# Patient Record
Sex: MALE | Race: WHITE | NOT HISPANIC OR LATINO | URBAN - METROPOLITAN AREA
[De-identification: names, ages, dates, MRNs, and addresses within clinical notes are randomized per-mention and may not be internally consistent; named-entity substitution may affect disease eponyms.]

---

## 2019-10-23 ENCOUNTER — HOSPITAL ENCOUNTER (OUTPATIENT)
Dept: OTHER | Facility: HOSPITAL | Age: 29
Discharge: HOME | End: 2019-10-23
Attending: NURSE PRACTITIONER

## 2019-10-23 ENCOUNTER — OFFICE VISIT CONVERTED (OUTPATIENT)
Dept: FAMILY MEDICINE CLINIC | Age: 29
End: 2019-10-23
Attending: NURSE PRACTITIONER

## 2019-10-23 LAB
ALBUMIN SERPL-MCNC: 4.8 G/DL (ref 3.5–5)
ALBUMIN/GLOB SERPL: 1.8 {RATIO} (ref 1.4–2.6)
ALP SERPL-CCNC: 68 U/L (ref 53–128)
ALT SERPL-CCNC: 17 U/L (ref 10–40)
ANION GAP SERPL CALC-SCNC: 20 MMOL/L (ref 8–19)
APPEARANCE UR: CLEAR
AST SERPL-CCNC: 18 U/L (ref 15–50)
BACTERIA UR QL AUTO: ABNORMAL
BILIRUB SERPL-MCNC: 0.45 MG/DL (ref 0.2–1.3)
BILIRUB UR QL: NEGATIVE
BUN SERPL-MCNC: 14 MG/DL (ref 5–25)
BUN/CREAT SERPL: 18 {RATIO} (ref 6–20)
CALCIUM SERPL-MCNC: 9.6 MG/DL (ref 8.7–10.4)
CASTS URNS QL MICRO: ABNORMAL /[LPF]
CHLORIDE SERPL-SCNC: 101 MMOL/L (ref 99–111)
COLOR UR: YELLOW
CONV CO2: 21 MMOL/L (ref 22–32)
CONV LEUKOCYTE ESTERASE: NEGATIVE
CONV TOTAL PROTEIN: 7.5 G/DL (ref 6.3–8.2)
CONV UROBILINOGEN IN URINE BY AUTOMATED TEST STRIP: 0.2 {EHRLICHU}/DL (ref 0.1–1)
CREAT UR-MCNC: 0.78 MG/DL (ref 0.7–1.2)
EPI CELLS #/AREA URNS HPF: ABNORMAL /[HPF]
ERYTHROCYTE [DISTWIDTH] IN BLOOD BY AUTOMATED COUNT: 11.7 % (ref 11.5–14.5)
GFR SERPLBLD BASED ON 1.73 SQ M-ARVRAT: >60 ML/MIN/{1.73_M2}
GLOBULIN UR ELPH-MCNC: 2.7 G/DL (ref 2–3.5)
GLUCOSE 24H UR-MCNC: NEGATIVE MG/DL
GLUCOSE SERPL-MCNC: 87 MG/DL (ref 70–99)
HBA1C MFR BLD: 15.4 G/DL (ref 14–18)
HCT VFR BLD AUTO: 43 % (ref 42–52)
HGB UR QL STRIP: NEGATIVE
KETONES UR QL STRIP: NEGATIVE MG/DL
MCH RBC QN AUTO: 31.4 PG (ref 27–31)
MCHC RBC AUTO-ENTMCNC: 35.8 G/DL (ref 33–37)
MCV RBC AUTO: 87.8 FL (ref 80–96)
MUCOUS THREADS URNS QL MICRO: ABNORMAL
NITRITE UR-MCNC: NEGATIVE MG/ML
OSMOLALITY SERPL CALC.SUM OF ELEC: 286 MOSM/KG (ref 273–304)
PH UR STRIP.AUTO: 7 [PH] (ref 5–8)
PLATELET # BLD AUTO: 279 10*3/UL (ref 130–400)
PMV BLD AUTO: 8.9 FL (ref 7.4–10.4)
POTASSIUM SERPL-SCNC: 4 MMOL/L (ref 3.5–5.3)
PROT UR-MCNC: NEGATIVE MG/DL
RBC # BLD AUTO: 4.9 10*6/UL (ref 4.7–6.1)
RBC # BLD AUTO: ABNORMAL /[HPF]
SODIUM SERPL-SCNC: 138 MMOL/L (ref 135–147)
SP GR UR STRIP: 1.02 (ref 1–1.03)
SPECIMEN SOURCE: ABNORMAL
UNIDENT CRYS URNS QL MICRO: ABNORMAL /[HPF]
WBC # BLD AUTO: 6.96 10*3/UL (ref 4.8–10.8)
WBC #/AREA URNS HPF: ABNORMAL /[HPF]

## 2020-11-23 ENCOUNTER — OFFICE VISIT CONVERTED (OUTPATIENT)
Dept: FAMILY MEDICINE CLINIC | Age: 30
End: 2020-11-23
Attending: NURSE PRACTITIONER

## 2020-11-23 ENCOUNTER — HOSPITAL ENCOUNTER (OUTPATIENT)
Dept: OTHER | Facility: HOSPITAL | Age: 30
Discharge: HOME | End: 2020-11-23
Attending: NURSE PRACTITIONER

## 2020-11-23 LAB
ALBUMIN SERPL-MCNC: 4.4 G/DL (ref 3.5–5)
ALBUMIN/GLOB SERPL: 2 {RATIO} (ref 1.4–2.6)
ALP SERPL-CCNC: 66 U/L (ref 53–128)
ALT SERPL-CCNC: 13 U/L (ref 10–40)
ANION GAP SERPL CALC-SCNC: 17 MMOL/L (ref 8–19)
APPEARANCE UR: CLEAR
AST SERPL-CCNC: 13 U/L (ref 15–50)
BACTERIA UR QL AUTO: ABNORMAL
BASOPHILS # BLD MANUAL: 0.05 10*3/UL (ref 0–0.2)
BASOPHILS NFR BLD MANUAL: 0.8 % (ref 0–3)
BILIRUB SERPL-MCNC: 0.45 MG/DL (ref 0.2–1.3)
BILIRUB UR QL: NEGATIVE
BUN SERPL-MCNC: 19 MG/DL (ref 5–25)
BUN/CREAT SERPL: 23 {RATIO} (ref 6–20)
CALCIUM SERPL-MCNC: 8.8 MG/DL (ref 8.7–10.4)
CASTS URNS QL MICRO: ABNORMAL /[LPF]
CHLORIDE SERPL-SCNC: 103 MMOL/L (ref 99–111)
CHOLEST SERPL-MCNC: 139 MG/DL (ref 107–200)
CHOLEST/HDLC SERPL: 2.9 {RATIO} (ref 3–6)
COLOR UR: YELLOW
CONV CO2: 23 MMOL/L (ref 22–32)
CONV LEUKOCYTE ESTERASE: NEGATIVE
CONV TOTAL PROTEIN: 6.6 G/DL (ref 6.3–8.2)
CONV UROBILINOGEN IN URINE BY AUTOMATED TEST STRIP: 0.2 {EHRLICHU}/DL (ref 0.1–1)
CREAT UR-MCNC: 0.84 MG/DL (ref 0.7–1.2)
DEPRECATED RDW RBC AUTO: 36.9 FL
EOSINOPHIL # BLD MANUAL: 0.07 10*3/UL (ref 0–0.7)
EOSINOPHIL NFR BLD MANUAL: 1.2 % (ref 0–7)
EPI CELLS #/AREA URNS HPF: ABNORMAL /[HPF]
ERYTHROCYTE [DISTWIDTH] IN BLOOD BY AUTOMATED COUNT: 11.4 % (ref 11.5–14.5)
GFR SERPLBLD BASED ON 1.73 SQ M-ARVRAT: >60 ML/MIN/{1.73_M2}
GLOBULIN UR ELPH-MCNC: 2.2 G/DL (ref 2–3.5)
GLUCOSE 24H UR-MCNC: NEGATIVE MG/DL
GLUCOSE SERPL-MCNC: 100 MG/DL (ref 70–99)
GRANS (ABSOLUTE): 3.61 10*3/UL (ref 2–8)
GRANS: 59.6 % (ref 30–85)
HBA1C MFR BLD: 14.2 G/DL (ref 14–18)
HCT VFR BLD AUTO: 39.2 % (ref 42–52)
HDLC SERPL-MCNC: 48 MG/DL (ref 40–60)
HGB UR QL STRIP: NEGATIVE
IMM GRANULOCYTES # BLD: 0.02 10*3/UL (ref 0–0.54)
IMM GRANULOCYTES NFR BLD: 0.3 % (ref 0–0.43)
KETONES UR QL STRIP: NEGATIVE MG/DL
LDLC SERPL CALC-MCNC: 58 MG/DL (ref 70–100)
LYMPHOCYTES # BLD MANUAL: 2 10*3/UL (ref 1–5)
LYMPHOCYTES NFR BLD MANUAL: 5.1 % (ref 3–10)
MCH RBC QN AUTO: 32 PG (ref 27–31)
MCHC RBC AUTO-ENTMCNC: 36.2 G/DL (ref 33–37)
MCV RBC AUTO: 88.3 FL (ref 80–96)
MONOCYTES # BLD AUTO: 0.31 10*3/UL (ref 0.2–1.2)
MUCOUS THREADS URNS QL MICRO: ABNORMAL
NITRITE UR-MCNC: NEGATIVE MG/ML
OSMOLALITY SERPL CALC.SUM OF ELEC: 290 MOSM/KG (ref 273–304)
PH UR STRIP.AUTO: 5.5 [PH] (ref 5–8)
PLATELET # BLD AUTO: 273 10*3/UL (ref 130–400)
PMV BLD AUTO: 9.1 FL (ref 7.4–10.4)
POTASSIUM SERPL-SCNC: 3.8 MMOL/L (ref 3.5–5.3)
PROT UR-MCNC: NEGATIVE MG/DL
RBC # BLD AUTO: 4.44 10*6/UL (ref 4.7–6.1)
RBC # BLD AUTO: ABNORMAL /[HPF]
SODIUM SERPL-SCNC: 139 MMOL/L (ref 135–147)
SP GR UR STRIP: >=1.03 (ref 1–1.03)
SPECIMEN SOURCE: ABNORMAL
TRIGL SERPL-MCNC: 164 MG/DL (ref 40–150)
TSH SERPL-ACNC: 1.81 M[IU]/L (ref 0.27–4.2)
UNIDENT CRYS URNS QL MICRO: ABNORMAL /[HPF]
VARIANT LYMPHS NFR BLD MANUAL: 33 % (ref 20–45)
VLDLC SERPL-MCNC: 33 MG/DL (ref 5–37)
WBC # BLD AUTO: 6.06 10*3/UL (ref 4.8–10.8)
WBC #/AREA URNS HPF: ABNORMAL /[HPF]

## 2021-05-18 NOTE — PROGRESS NOTES
Chencho Vines Kailash  1990     Office/Outpatient Visit    Visit Date: Mon, Nov 23, 2020 01:47 pm    Provider: Maria Elena Montelongo N.P. (Assistant: Gerri Gibson MA)    Location: [facility]        Electronically signed by Maria Elena Montelongo N.P. on  11/23/2020 02:15:32 PM                             Subjective:        CC: Kailash is a 30 year old White male.  Preventative exam         HPI:           Patient to be evaluated for encounter for general adult medical examination without abnormal findings.  His last physical exam was last year.  He performs testicular self-exams occasionally.  Depression screen is performed and is negative.  He is current with his influenza immunization.      Smoking Status:  Nonsmoker           PHQ-9 Depression Screening: Completed form scanned and in chart; Total Score 0       wants flu shot     ROS:     CONSTITUTIONAL:  Negative for chills, fatigue, fever, and weight change.      CARDIOVASCULAR:  Negative for chest pain, palpitations, tachycardia, orthopnea, and edema.      RESPIRATORY:  Negative for cough, dyspnea, and hemoptysis.      GASTROINTESTINAL:  Positive for acid reflux symptoms ( acid taste in mouth; burning in throat and upper chest; indigestion and belching; taking Protonix daily but still having symptoms, sanjuanita after [place] ).      GENITOURINARY:  Negative for dysuria, genital lesions, hematuria, impotence, polyuria, and changes in urine stream.      ENDOCRINE:  Negative for hair loss, heat/cold intolerance, polydipsia, and polyphagia.      ALLERGIC/IMMUNOLOGIC:  Negative for allergies, frequent illnesses, HIV exposure, and urticaria.      PSYCHIATRIC:  Negative for anxiety, depression, and sleep disturbances.          Past Medical History / Family History / Social History:         Last Reviewed on 11/23/2020 02:06 PM by Maria Elena Montelongo    Past Medical History:             PAST MEDICAL HISTORY     UNREMARKABLE         Surgical History:          Appendectomy: age 17;         Family History:     Father: Healthy     Mother:    Positive for Type 2 Diabetes;     Brother(s): 1 brother alive and healhty brother(s) total         Social History:     Occupation: Super for [employer]     Marital Status:      Children: 2 children         Tobacco/Alcohol/Supplements:     Last Reviewed on 11/23/2020 02:06 PM by Maria Elena Montelongo    Tobacco: He has never smoked.          Substance Abuse History:     Last Reviewed on 11/23/2020 02:06 PM by Maria Elena Montelongo        Mental Health History:     Last Reviewed on 11/23/2020 02:06 PM by Maria Elena Montelongo        Communicable Diseases (eg STDs):     Last Reviewed on 11/23/2020 02:06 PM by Maria Elena Montelongo        Current Problems:     Last Reviewed on 11/23/2020 02:06 PM by Maria Elena Montelongo    Gastro-esophageal reflux disease with esophagitis, without bleeding        Immunizations:     Fluzone Quadrivalent (3+ years) 10/23/2019        Allergies:     Last Reviewed on 11/23/2020 02:06 PM by Maria Elena Montelongo    Ceclor:          Current Medications:     Last Reviewed on 11/23/2020 02:06 PM by Maria Elena Montelongo    pantoprazole 20 mg oral tablet, delayed release (enteric coated) [TAKE 1 TABLET BY MOUTH EVERY DAY]    EpiPen 0.3 mg/0.3 mL injection Auto-Injector [inject 0.3 milliliter (0.3 mg) by intramuscular route once]        Objective:        Vitals:         Current: 11/23/2020 1:53:50 PM    Ht:  6 ft, 1 in;  Wt: 209.6 lbs;  BMI: 27.7T: 98.1 F (temporal);  BP: 114/69 mm Hg (left arm, sitting);  P: 68 bpm (left arm (BP Cuff), sitting);  sCr: 0.78 mg/dL;  GFR: 146.40        Exams:     PHYSICAL EXAM:     GENERAL: Vitals recorded well developed, well nourished;  well groomed;  no apparent distress;     RESPIRATORY: normal respiratory rate and pattern with no distress; normal breath sounds with no rales, rhonchi, wheezes or rubs;     CARDIOVASCULAR: normal rate; rhythm is regular;  no systolic murmur; no  edema;     GASTROINTESTINAL: nontender, nondistended; no hepatosplenomegaly or masses; no bruits;     MUSCULOSKELETAL:  Normal range of motion, strength and tone;     NEUROLOGIC: GROSSLY INTACT     PSYCHIATRIC:  appropriate affect and demeanor; normal speech pattern; grossly normal memory;         Procedures:     Encounter for immunization    1. Patient experienced no reaction.  Regarding contraindications to an Influenza vaccine: Denies moderate/severe illness with/without fever; serious reaction to eggs, egg proteins, gentamicin, gelatin, arginine, neomycin or polymixin; serious reaction after recieving previous influenza vaccines; and history of Guillain-[place] Syndrome.              Assessment:         Z00.00   Encounter for general adult medical examination without abnormal findings       Z13.31   Encounter for screening for depression       Z23   Encounter for immunization       K21.00   Gastro-esophageal reflux disease with esophagitis, without bleeding           ORDERS:         Meds Prescribed:       [Refilled] pantoprazole 20 mg oral tablet, delayed release (enteric coated) [TAKE 1 TABLET BY MOUTH EVERY DAY], #90 (ninety) tablets, Refills: 3 (three)       [New Rx] Zofran 4 mg oral tablet [1 tablet every 6 hrs prn nausea], #30 (thirty) tablets, Refills: 1 (one)       [New Rx] cimetidine 200 mg oral tablet [take 1 tablet (200 mg) by oral route at bedtime ], #90 (ninety) tablets, Refills: 1 (one)         Lab Orders:       33136  [facility] PHYSICAL: CMP, CBC, TSH, LIPID: [id number], [id number], [id number], [id number]  (Send-Out)            03382  [facility] Urinalysis, automated, with micro  (Send-Out)            APPTO  Appointment need  (In-House)              Procedures Ordered:       04330  Immunization administration; one vaccine  (In-House)              Other Orders:       73705  Influenza virus vaccine, quadrivalent, split virus, preservative free 3 years of age & older  (In-House)              Depression screen  negative  (In-House)            1101F  Pt screen for fall risk; document no falls in past year or only 1 fall w/o injury in past year (SANDHYA)  (In-House)                      Plan:         Encounter for general adult medical examination without abnormal findings    LABORATORY:  Labs ordered to be performed today include PHYSICAL PANEL; CMP, CBC, TSH, LIPID and urinalysis with micro.  MIPS Has had no falls or only one fall without injury in the past year Vaccines Flu and Pneumonia updated in Shot record           Orders:       32274  [facility] PHYSICAL: CMP, CBC, TSH, LIPID: [id number], [id number], [id number], 75912  (Send-Out)            82490  [facility] Urinalysis, automated, with micro  (Send-Out)            1101F  Pt screen for fall risk; document no falls in past year or only 1 fall w/o injury in past year (SANDHYA)  (In-House)              Encounter for screening for depression    MIPS PHQ-9 Depression Screening: Completed form scanned and in chart; Total Score 0; Negative Depression Screen     FOLLOW-UP: Schedule follow-up appointments on a p.r.n. basis.:in 1 year:.            Orders:       APPTO  Appointment need  (In-House)              Depression screen negative  (In-House)              Encounter for immunization          Immunizations:       88691  Immunization administration; one vaccine  (In-House)            32101  Influenza virus vaccine, quadrivalent, split virus, preservative free 3 years of age & older  (In-House)                Dose (ml): 0.5  Site: left deltoid  Route: intramuscular  Administered by: Gerri Gibson          : [employer]  Lot #: [id number]  Exp: 06/30/2021          NDC: [address]-89        Gastro-esophageal reflux disease with esophagitis, without bleedingWill follow up phone call 1 mo, if not improving will need EGD. dmt          Prescriptions:       [Refilled] pantoprazole 20 mg oral tablet, delayed release (enteric coated) [TAKE 1 TABLET BY MOUTH EVERY DAY], #90 (ninety)  tablets, Refills: 3 (three)       [New Rx] Zofran 4 mg oral tablet [1 tablet every 6 hrs prn nausea], #30 (thirty) tablets, Refills: 1 (one)       [New Rx] cimetidine 200 mg oral tablet [take 1 tablet (200 mg) by oral route at bedtime ], #90 (ninety) tablets, Refills: 1 (one)             Patient Recommendations:        For  Encounter for screening for depression:    Schedule follow-up appointments as needed.                APPOINTMENT INFORMATION:        Monday Tuesday Wednesday Thursday Friday Saturday Sunday            Time:___________________AM  PM   Date:_____________________             Charge Capture:         Primary Diagnosis:     Z00.00  Encounter for general adult medical examination without abnormal findings           Orders:      18357  Preventive medicine, established patient, age 18-39 years  (In-House)            1101F  Pt screen for fall risk; document no falls in past year or only 1 fall w/o injury in past year (SANDHYA)  (In-House)              Z13.31  Encounter for screening for depression           Orders:      APPTO  Appointment need  (In-House)              Depression screen negative  (In-House)              Z23  Encounter for immunization           Orders:      00904  Immunization administration; one vaccine  (In-House)            03711  Influenza virus vaccine, quadrivalent, split virus, preservative free 3 years of age & older  (In-House)              K21.00  Gastro-esophageal reflux disease with esophagitis, without bleeding         ADDENDUMS:      ____________________________________    Addendum: 12/31/2020 02:17 PM - Monique Monae        Spoke with patient and states since starting the medication, he has had very little to no GERD episodes./tlb

## 2021-07-01 VITALS
BODY MASS INDEX: 27.91 KG/M2 | WEIGHT: 210.6 LBS | SYSTOLIC BLOOD PRESSURE: 125 MMHG | HEART RATE: 83 BPM | TEMPERATURE: 98.4 F | HEIGHT: 73 IN | DIASTOLIC BLOOD PRESSURE: 91 MMHG

## 2021-07-02 VITALS
DIASTOLIC BLOOD PRESSURE: 69 MMHG | HEIGHT: 73 IN | TEMPERATURE: 98.1 F | SYSTOLIC BLOOD PRESSURE: 114 MMHG | WEIGHT: 209.6 LBS | HEART RATE: 68 BPM | BODY MASS INDEX: 27.78 KG/M2

## 2023-03-13 ENCOUNTER — TELEPHONE (OUTPATIENT)
Dept: PEDIATRICS | Facility: OTHER | Age: 33
End: 2023-03-13
Payer: COMMERCIAL

## 2023-03-13 NOTE — TELEPHONE ENCOUNTER
Caller: OLESYA TREJO    Relationship to patient: Emergency Contact    Best call back number: [phone]    Chief complaint: NUMBNESS AND TINGLING IN ARM    Patient directed to call 911 or go to their nearest emergency room.     Patient verbalized understanding: [x] Yes  [] No    Additional notes: PATIENTS WIFE CALLED TO SCHEDULE AN APPOINTMENT. SHE EXPLAINED THAT THE PATIENT WOULD LIKE TO BE SEEN FOR NUMBNESS AND TINGLING IN HIS ARM. HUB ADVISED THAT THE PATIENT BE SEEN IN THE ER. PATIENTS WIFE EXPLAINED THAT THESE SYMPTOMS HAVE BEEN AN ONGOING ISSUE FOR A WHILE AND THAT HE WILL NOT WANT TO GO TO THE ER. HUB EXPLAINED THAT SHE CAN BE WARM TRANSFERRED TO NURSE TRIAGE SINCE THE PATIENT WILL NOT WANT TO GO TO THE ER. SHE STATED THAT SHE WILL SPEAK WITH THE PATIENT AND SEE WHAT HE WANTS TO DO AND GOT OFF THE PHONE.

## 2023-04-13 ENCOUNTER — TELEPHONE (OUTPATIENT)
Dept: FAMILY MEDICINE CLINIC | Age: 33
End: 2023-04-13

## 2023-04-13 ENCOUNTER — OFFICE VISIT (OUTPATIENT)
Dept: FAMILY MEDICINE CLINIC | Age: 33
End: 2023-04-13
Payer: COMMERCIAL

## 2023-04-13 VITALS
WEIGHT: 209.6 LBS | DIASTOLIC BLOOD PRESSURE: 82 MMHG | HEART RATE: 64 BPM | BODY MASS INDEX: 27.78 KG/M2 | TEMPERATURE: 97.4 F | HEIGHT: 73 IN | SYSTOLIC BLOOD PRESSURE: 121 MMHG

## 2023-04-13 DIAGNOSIS — M50.30 DDD (DEGENERATIVE DISC DISEASE), CERVICAL: Primary | ICD-10-CM

## 2023-04-13 PROCEDURE — 99214 OFFICE O/P EST MOD 30 MIN: CPT | Performed by: NURSE PRACTITIONER

## 2023-04-13 RX ORDER — CETIRIZINE HYDROCHLORIDE 10 MG/1
10 TABLET ORAL 2 TIMES DAILY
COMMUNITY

## 2023-04-13 RX ORDER — PREDNISONE 10 MG/1
TABLET ORAL
Qty: 35 TABLET | Refills: 0 | Status: SHIPPED | OUTPATIENT
Start: 2023-04-13 | End: 2023-04-28

## 2023-04-13 NOTE — PROGRESS NOTES
Chief Complaint  Geoff Vines presents to [facility] for Establish Care (Discuss nerve damage on neck and back, constant burning cessation)    Subjective          History of Present Illness    Kailash is here today with c/o cervicalgia radiating down right arm/numbness and tingling. Describes as burning. Some numbness as well. He reports that he had a nerve test performed last year. Told that he was not having carpal tunnel. This was through [facility]. He was told that he had pinched nerve in neck and elbow. He has been receiving cortisone injections in his hand/wrist. Symptoms worsening over the last few months. He does work outside in construction. After the nerve testing, he was advised Ibuprofen for neck as well as hand/wrist. He wears elbow brace and wrist brace at night. He has also been using Icy Hot. He thinks that he had xray of his neck as well.     Review of Systems        Allergies   Allergen Reactions   • Cefaclor Unknown - High Severity      History reviewed. No pertinent past medical history.  Current Outpatient Medications   Medication Sig Dispense Refill   • cetirizine (zyrTEC) 10 MG tablet Take 1 tablet by mouth 2 (Two) Times a Day.     • predniSONE (DELTASONE) 10 MG tablet Take 2 tablets by mouth 2 (Two) Times a Day for 5 days, THEN 1 tablet 2 (Two) Times a Day for 5 days, THEN 1 tablet Daily for 5 days. 35 tablet 0     No current facility-administered medications for this visit.     Past Surgical History:   Procedure Laterality Date   • APPENDECTOMY        Social History     Tobacco Use   • Smoking status: Never     Passive exposure: Never   • Smokeless tobacco: Current     Types: Chew   Vaping Use   • Vaping Use: Never used   Substance Use Topics   • Alcohol use: Yes     Comment: socially   • Drug use: Never     Family History   Problem Relation Age of Onset   • Diabetes Maternal Aunt    • Diabetes Maternal Grandmother    • Lung cancer Paternal  "Grandfather      Health Maintenance Due   Topic Date Due   • HEPATITIS C SCREENING  Never done   • ANNUAL PHYSICAL  Never done        There is no immunization history on file for this patient.     Objective     Vitals:    04/13/23 0929   BP: 121/82   BP Location: Left arm   Patient Position: Sitting   Pulse: 64   Temp: 97.4 °F (36.3 °C)   TempSrc: Oral   Weight: 95.1 kg (209 lb 9.6 oz)   Height: 185.4 cm (73\")     Body mass index is 27.65 kg/m².     Physical Exam  Vitals reviewed.   Constitutional:       General: He is not in acute distress.     Appearance: Normal appearance. He is well-developed.   HENT:      Head: Normocephalic and atraumatic.   Cardiovascular:      Rate and Rhythm: Normal rate and regular rhythm.   Pulmonary:      Effort: Pulmonary effort is normal.      Breath sounds: Normal breath sounds.   Musculoskeletal:         General: Normal range of motion.   Neurological:      Mental Status: He is alert and oriented to person, place, and time.   Psychiatric:         Mood and Affect: Mood and affect normal.           Result Review :                               Assessment and Plan      Diagnoses and all orders for this visit:    1. DDD (degenerative disc disease), cervical (Primary)  -     Cancel: Ambulatory Referral to Physical Therapy Evaluate and treat  -     Ambulatory Referral to Physical Therapy Evaluate and treat  -     predniSONE (DELTASONE) 10 MG tablet; Take 2 tablets by mouth 2 (Two) Times a Day for 5 days, THEN 1 tablet 2 (Two) Times a Day for 5 days, THEN 1 tablet Daily for 5 days.  Dispense: 35 tablet; Refill: 0      Prior imaging studies not available for review at the time of this visit. With reported history, recommend physical therapy for additional evaluation. Will request copies of imaging. Additionally, will prescribe course of oral steroids to treat inflammation.           Follow Up     Return for Annual physical.             "

## 2023-04-13 NOTE — TELEPHONE ENCOUNTER
Please request copy of nerve conduction study and neck xray from [facility]. If not available from there, pt reports this was performed by Dr. Kirby Cue[facility]sa [facility] in [place]  
Records requested  
Patient

## 2023-07-25 ENCOUNTER — TELEPHONE (OUTPATIENT)
Dept: FAMILY MEDICINE CLINIC | Age: 33
End: 2023-07-25
Payer: COMMERCIAL

## 2023-07-25 NOTE — TELEPHONE ENCOUNTER
Contacted patient to inform on first no show 7/24/23 at 9:15 with Eliza Starr, stated he did not want to rs and was informed on no show policy. A letter was also sent.

## 2023-09-06 ENCOUNTER — OFFICE VISIT (OUTPATIENT)
Dept: FAMILY MEDICINE CLINIC | Age: 33
End: 2023-09-06
Payer: COMMERCIAL

## 2023-09-06 VITALS
WEIGHT: 217 LBS | HEART RATE: 64 BPM | SYSTOLIC BLOOD PRESSURE: 122 MMHG | BODY MASS INDEX: 28.76 KG/M2 | DIASTOLIC BLOOD PRESSURE: 87 MMHG | HEIGHT: 73 IN | OXYGEN SATURATION: 96 % | TEMPERATURE: 98.2 F

## 2023-09-06 DIAGNOSIS — Z11.59 SCREENING FOR VIRAL DISEASE: ICD-10-CM

## 2023-09-06 DIAGNOSIS — R21 FACIAL RASH: ICD-10-CM

## 2023-09-06 DIAGNOSIS — Z00.00 ANNUAL PHYSICAL EXAM: Primary | ICD-10-CM

## 2023-09-06 PROCEDURE — 99395 PREV VISIT EST AGE 18-39: CPT | Performed by: NURSE PRACTITIONER

## 2023-09-06 NOTE — PROGRESS NOTES
Chief Complaint  Geoff Vines presents to [facility] for Annual Exam    Subjective          History of Present Illness    Kailash is here today for general adult medical examination. This was last performed 11/23/2020.   Declines covid vaccine.  He reports Tdap vaccine was received approximately 6 years ago when child was born.   Never cigarette smoker. Does chew tobacco.     He reports rash to bilateral cheeks. First noticed 12-13 years ago. Worse with heat and sweating. He has been seen by [place] [facility] but never received an official diagnosis. Tried several creams including steroid and antifungal without improvement. He would like to see a different dermatologist. He did noticed some improvement with change in facial .     Review of Systems   Constitutional:  Negative for chills and fever.   HENT:  Negative for ear pain and sore throat.    Eyes:  Negative for blurred vision and redness.   Respiratory:  Negative for shortness of breath and wheezing.    Cardiovascular:  Negative for chest pain and palpitations.   Gastrointestinal:  Negative for abdominal pain and vomiting.   Genitourinary:  Negative for dysuria, frequency and urgency.   Skin:  Positive for rash.   Neurological:  Negative for seizures and syncope.   Psychiatric/Behavioral:  Negative for suicidal ideas and depressed mood.        Allergies   Allergen Reactions    Cefaclor Unknown - High Severity      History reviewed. No pertinent past medical history.  Current Outpatient Medications   Medication Sig Dispense Refill    cetirizine (zyrTEC) 10 MG tablet Take 1 tablet by mouth Daily.       No current facility-administered medications for this visit.     Past Surgical History:   Procedure Laterality Date    APPENDECTOMY        Social History     Tobacco Use    Smoking status: Never     Passive exposure: Never    Smokeless tobacco: Current     Types: Chew   Vaping Use    Vaping Use: Never used  "  Substance Use Topics    Alcohol use: Yes     Comment: socially    Drug use: Never     Family History   Problem Relation Age of Onset    Diabetes Maternal Aunt     Diabetes Maternal Grandmother     Lung cancer Paternal Grandfather      Health Maintenance Due   Topic Date Due    HEPATITIS C SCREENING  Never done    ANNUAL PHYSICAL  Never done        There is no immunization history on file for this patient.     Objective     Vitals:    09/06/23 1118   BP: 122/87   BP Location: Left arm   Patient Position: Sitting   Cuff Size: Adult   Pulse: 64   Temp: 98.2 °F (36.8 °C)   TempSrc: Oral   SpO2: 96%   Weight: 98.4 kg (217 lb)   Height: 185.4 cm (73\")     Body mass index is 28.63 kg/m².     BMI is >= 25 and <30. (Overweight) The following options were offered after discussion;: exercise counseling/recommendations and nutrition counseling/recommendations       Physical Exam  Vitals reviewed.   Constitutional:       General: He is not in acute distress.     Appearance: Normal appearance.   HENT:      Head: Normocephalic and atraumatic.      Right Ear: Hearing, tympanic membrane and ear canal normal.      Left Ear: Hearing, tympanic membrane and ear canal normal.      Mouth/Throat:      Mouth: Mucous membranes are moist.   Eyes:      Extraocular Movements: Extraocular movements intact.      Pupils: Pupils are equal, round, and reactive to light.   Cardiovascular:      Rate and Rhythm: Normal rate and regular rhythm.   Pulmonary:      Effort: Pulmonary effort is normal. No respiratory distress.      Breath sounds: Normal breath sounds.   Abdominal:      General: Bowel sounds are normal.      Palpations: Abdomen is soft.      Tenderness: There is no abdominal tenderness.   Musculoskeletal:         General: Normal range of motion.      Cervical back: Normal range of motion and neck supple.   Skin:     General: Skin is warm and dry.      Findings: Rash (maculopapular rash to bilateral cheeks) present.   Neurological:      " Mental Status: He is alert and oriented to person, place, and time.   Psychiatric:         Mood and Affect: Mood normal.         Result Review :                               Assessment and Plan      Diagnoses and all orders for this visit:    1. Annual physical exam (Primary)  Comments:  Appropriate screenings and vaccinations were reviewed with the pt and offered as indicated.  Pt counseled on healthy lifestyle including healthy diet, exercise.  Orders:  -     CBC No Differential; Future  -     Comprehensive metabolic panel; Future  -     Lipid panel; Future    2. Facial rash  Comments:  Discussed rosacea. Will refer to dermatology.  Orders:  -     Ambulatory Referral to Dermatology    3. Screening for viral disease  -     Hepatitis C antibody; Future              Follow Up     Return in about 1 year (around 9/6/2024) for Annual physical.

## 2023-09-19 ENCOUNTER — OFFICE VISIT (OUTPATIENT)
Dept: FAMILY MEDICINE CLINIC | Age: 33
End: 2023-09-19
Payer: COMMERCIAL

## 2023-09-19 VITALS
OXYGEN SATURATION: 97 % | HEIGHT: 73 IN | SYSTOLIC BLOOD PRESSURE: 132 MMHG | BODY MASS INDEX: 28.89 KG/M2 | HEART RATE: 75 BPM | WEIGHT: 218 LBS | DIASTOLIC BLOOD PRESSURE: 81 MMHG | TEMPERATURE: 98.1 F

## 2023-09-19 DIAGNOSIS — M54.9 MID BACK PAIN: Primary | ICD-10-CM

## 2023-09-19 PROCEDURE — 99213 OFFICE O/P EST LOW 20 MIN: CPT | Performed by: PHYSICIAN ASSISTANT

## 2023-09-19 NOTE — PROGRESS NOTES
"  Diagnoses and all orders for this visit:    1. Mid back pain (Primary)  Comments:  Discussed ibuprofen and tylenol use. Declines muscle relaxers today due to side effects. Return precautions discussed.            Subjective     CHIEF COMPLAINT    Chief Complaint   Patient presents with    Back Pain     Back pain x 1 day.             History of Present Illness  This is a 33 year old male who presents with back pain x1 day. He stated yesterday he was filling up a 5 gallon gas can and went to put it in the bed of his truck when his \"back locked up\". He had immediate pain between his shoulder blades that resolved after a minute of standing there. He describes the pain as a \"pinch\". The pain does not radiate anywhere. He reports sleep disturbance due to pain and discomfort. He has tried ibuprofen and ice with minimal relief. He denies any numbness or tingling to upper and lower extremities.           Review of Systems   Constitutional:  Negative for chills and fever.   Respiratory:  Negative for shortness of breath.    Cardiovascular:  Negative for chest pain.   Musculoskeletal:  Positive for back pain. Negative for arthralgias, gait problem and myalgias.   Neurological:  Negative for weakness and numbness.          History reviewed. No pertinent past medical history.         Past Surgical History:   Procedure Laterality Date    APPENDECTOMY              Family History   Problem Relation Age of Onset    Diabetes Maternal Aunt     Diabetes Maternal Grandmother     Lung cancer Paternal Grandfather             Social History     Socioeconomic History    Marital status:    Tobacco Use    Smoking status: Never     Passive exposure: Never    Smokeless tobacco: Current     Types: Chew   Vaping Use    Vaping Use: Never used   Substance and Sexual Activity    Alcohol use: Yes     Comment: socially    Drug use: Never    Sexual activity: Yes     Partners: Female            Allergies   Allergen Reactions    Cefaclor Unknown " "- High Severity            Current Outpatient Medications on File Prior to Visit   Medication Sig Dispense Refill    cetirizine (zyrTEC) 10 MG tablet Take 1 tablet by mouth Daily.       No current facility-administered medications on file prior to visit.            /81 (BP Location: Left arm, Patient Position: Sitting, Cuff Size: Large Adult)   Pulse 75   Temp 98.1 °F (36.7 °C) (Oral)   Ht 185.4 cm (73\")   Wt 98.9 kg (218 lb)   SpO2 97%   BMI 28.76 kg/m²          Objective     Physical Exam  Vitals and nursing note reviewed.   Constitutional:       Appearance: Normal appearance.   HENT:      Head: Normocephalic and atraumatic.   Cardiovascular:      Rate and Rhythm: Normal rate and regular rhythm.      Heart sounds: Normal heart sounds.   Pulmonary:      Effort: Pulmonary effort is normal.      Breath sounds: Normal breath sounds.   Musculoskeletal:      Cervical back: Normal range of motion.      Thoracic back: Spasms and tenderness present. No bony tenderness.        Back:       Comments: Location of pain   Skin:     General: Skin is warm and dry.      Capillary Refill: Capillary refill takes less than 2 seconds.   Neurological:      Mental Status: He is alert and oriented to person, place, and time. Mental status is at baseline.   Psychiatric:         Mood and Affect: Mood normal.         Behavior: Behavior normal.                      Diagnoses and all orders for this visit:    1. Mid back pain (Primary)  Comments:  Discussed ibuprofen and tylenol use. Declines muscle relaxers today due to side effects. Return precautions discussed.             FOR FULL DISCHARGE INSTRUCTIONS/COMMENTS/HANDOUTS please see the   AVS      "

## 2023-10-20 ENCOUNTER — TELEPHONE (OUTPATIENT)
Dept: FAMILY MEDICINE CLINIC | Age: 33
End: 2023-10-20
Payer: COMMERCIAL